# Patient Record
Sex: FEMALE | Race: WHITE | NOT HISPANIC OR LATINO | Employment: FULL TIME | URBAN - METROPOLITAN AREA
[De-identification: names, ages, dates, MRNs, and addresses within clinical notes are randomized per-mention and may not be internally consistent; named-entity substitution may affect disease eponyms.]

---

## 2023-03-28 LAB
ALANINE AMINOTRANSFERASE (SGPT) (U/L) IN SER/PLAS: 16 U/L (ref 7–45)
ALBUMIN (G/DL) IN SER/PLAS: 4.3 G/DL (ref 3.4–5)
ALKALINE PHOSPHATASE (U/L) IN SER/PLAS: 44 U/L (ref 33–136)
ANION GAP IN SER/PLAS: 10 MMOL/L (ref 10–20)
ASPARTATE AMINOTRANSFERASE (SGOT) (U/L) IN SER/PLAS: 21 U/L (ref 9–39)
BILIRUBIN TOTAL (MG/DL) IN SER/PLAS: 0.5 MG/DL (ref 0–1.2)
CALCIDIOL (25 OH VITAMIN D3) (NG/ML) IN SER/PLAS: 31 NG/ML
CALCIUM (MG/DL) IN SER/PLAS: 9.1 MG/DL (ref 8.6–10.3)
CARBON DIOXIDE, TOTAL (MMOL/L) IN SER/PLAS: 31 MMOL/L (ref 21–32)
CHLORIDE (MMOL/L) IN SER/PLAS: 101 MMOL/L (ref 98–107)
CHOLESTEROL (MG/DL) IN SER/PLAS: 208 MG/DL (ref 0–199)
CHOLESTEROL IN HDL (MG/DL) IN SER/PLAS: 57.9 MG/DL
CHOLESTEROL/HDL RATIO: 3.6
CREATININE (MG/DL) IN SER/PLAS: 0.66 MG/DL (ref 0.5–1.05)
GFR FEMALE: >90 ML/MIN/1.73M2
GLUCOSE (MG/DL) IN SER/PLAS: 80 MG/DL (ref 74–99)
LDL: 136 MG/DL (ref 0–99)
POTASSIUM (MMOL/L) IN SER/PLAS: 4.3 MMOL/L (ref 3.5–5.3)
PROTEIN TOTAL: 6.9 G/DL (ref 6.4–8.2)
SODIUM (MMOL/L) IN SER/PLAS: 138 MMOL/L (ref 136–145)
TRIGLYCERIDE (MG/DL) IN SER/PLAS: 73 MG/DL (ref 0–149)
UREA NITROGEN (MG/DL) IN SER/PLAS: 18 MG/DL (ref 6–23)
VLDL: 15 MG/DL (ref 0–40)

## 2023-04-07 ENCOUNTER — TELEPHONE (OUTPATIENT)
Dept: PRIMARY CARE | Facility: CLINIC | Age: 68
End: 2023-04-07
Payer: COMMERCIAL

## 2023-04-07 NOTE — TELEPHONE ENCOUNTER
Jacquelin dropped off physician screening form to be filled out and faxed back to number on form. Put In Manisha's bin

## 2023-12-11 NOTE — PROGRESS NOTES
"Subjective   Reason for Visit: Jacquelin Mehta is an 68 y.o. female here for Annual CPE      HPI  Jacquelin is a 67 yo female presenting today for Annual CPE    Pt presents today for Annual CPE  Pt reports \"I need medicine for my cold sore breakouts\"  No other concerns today   She is still working with elderly people in [facility]     Pt is not sure about flu vaccine  States \"maybe you can convince me, you did last year\"     #HM  MAMM: DUE per GYN   COLON: UTD  DEXA: 2022  FBW: UTD March 2023  VACCINES: Refuses flu and covid        Patient Care Team:  DAYNA Bello as PCP - General  DAYNA Bello as PCP - [facility] P[facility]       Review of Systems  All 13 systems were reviewed and are within normal limits except positive and pertinent negative responses which are noted below or in HPI.      Objective   Vitals:  /78   Pulse 76   Ht 1.626 m (5' 4\")   Wt 83.5 kg (184 lb)   BMI 31.58 kg/m²       Current Outpatient Medications   Medication Instructions    valACYclovir (VALTREX) 1,000 mg, oral, 2 times daily         Physical Exam  Vitals reviewed.   HENT:      Right Ear: Tympanic membrane normal.      Left Ear: Tympanic membrane normal.      Mouth/Throat:      Mouth: Mucous membranes are moist.   Eyes:      Conjunctiva/sclera: Conjunctivae normal.   Cardiovascular:      Pulses: Normal pulses.      Heart sounds: Normal heart sounds.   Pulmonary:      Effort: Pulmonary effort is normal.      Breath sounds: Normal breath sounds.   Abdominal:      General: Bowel sounds are normal.   Skin:     General: Skin is warm and dry.      Capillary Refill: Capillary refill takes less than 2 seconds.   Neurological:      Mental Status: She is alert.   Psychiatric:         Mood and Affect: Mood normal.         Behavior: Behavior normal.         Assessment/Plan     Problem List Items Addressed This Visit             ICD-10-CM    HSV (herpes simplex virus) infection B00.9    Relevant Medications    valACYclovir " (Valtrex) 1 gram tablet     Other Visit Diagnoses         Codes    Annual physical exam    -  Primary Z00.00    Need for immunization against influenza     Z23    Relevant Orders    Flu vaccine, quadrivalent, high-dose, preservative free, age 65y+ (FLUZONE) (Completed)    Class 1 obesity due to excess calories without serious comorbidity with body mass index (BMI) of 31.0 to 31.9 in adult     E66.09, Z68.31    Relevant Orders    Comprehensive Metabolic Panel    Lipid Panel    Vitamin D 25-Hydroxy,Total (for eval of Vitamin D levels)

## 2023-12-12 ENCOUNTER — OFFICE VISIT (OUTPATIENT)
Dept: PRIMARY CARE | Facility: CLINIC | Age: 68
End: 2023-12-12
Payer: COMMERCIAL

## 2023-12-12 VITALS
SYSTOLIC BLOOD PRESSURE: 134 MMHG | HEART RATE: 76 BPM | HEIGHT: 64 IN | BODY MASS INDEX: 31.41 KG/M2 | WEIGHT: 184 LBS | DIASTOLIC BLOOD PRESSURE: 78 MMHG

## 2023-12-12 DIAGNOSIS — B00.9 HSV (HERPES SIMPLEX VIRUS) INFECTION: ICD-10-CM

## 2023-12-12 DIAGNOSIS — Z00.00 ANNUAL PHYSICAL EXAM: Primary | ICD-10-CM

## 2023-12-12 DIAGNOSIS — Z23 NEED FOR IMMUNIZATION AGAINST INFLUENZA: ICD-10-CM

## 2023-12-12 DIAGNOSIS — E66.09 CLASS 1 OBESITY DUE TO EXCESS CALORIES WITHOUT SERIOUS COMORBIDITY WITH BODY MASS INDEX (BMI) OF 31.0 TO 31.9 IN ADULT: ICD-10-CM

## 2023-12-12 PROBLEM — E78.5 HYPERLIPIDEMIA: Status: ACTIVE | Noted: 2023-12-12

## 2023-12-12 PROBLEM — N81.4 CYSTOCELE WITH UTERINE PROLAPSE: Status: ACTIVE | Noted: 2023-12-12

## 2023-12-12 PROBLEM — Z86.010 HISTORY OF COLON POLYPS: Status: ACTIVE | Noted: 2023-12-12

## 2023-12-12 PROBLEM — M62.89 PELVIC FLOOR DYSFUNCTION: Status: ACTIVE | Noted: 2023-12-12

## 2023-12-12 PROBLEM — N81.4 PROLAPSE, UTEROVAGINAL: Status: ACTIVE | Noted: 2023-12-12

## 2023-12-12 PROBLEM — M25.561 BILATERAL KNEE PAIN: Status: ACTIVE | Noted: 2023-12-12

## 2023-12-12 PROBLEM — K57.90 DIVERTICULOSIS: Status: ACTIVE | Noted: 2023-12-12

## 2023-12-12 PROBLEM — E28.39 HYPOESTROGENISM: Status: ACTIVE | Noted: 2023-12-12

## 2023-12-12 PROBLEM — D12.6 POLYP OF COLON, ADENOMATOUS: Status: ACTIVE | Noted: 2023-12-12

## 2023-12-12 PROBLEM — M25.562 BILATERAL KNEE PAIN: Status: ACTIVE | Noted: 2023-12-12

## 2023-12-12 PROBLEM — Z86.0100 HISTORY OF COLON POLYPS: Status: ACTIVE | Noted: 2023-12-12

## 2023-12-12 PROCEDURE — 1036F TOBACCO NON-USER: CPT | Performed by: NURSE PRACTITIONER

## 2023-12-12 PROCEDURE — 3008F BODY MASS INDEX DOCD: CPT | Performed by: NURSE PRACTITIONER

## 2023-12-12 PROCEDURE — 99397 PER PM REEVAL EST PAT 65+ YR: CPT | Performed by: NURSE PRACTITIONER

## 2023-12-12 PROCEDURE — 1159F MED LIST DOCD IN RCRD: CPT | Performed by: NURSE PRACTITIONER

## 2023-12-12 PROCEDURE — 1126F AMNT PAIN NOTED NONE PRSNT: CPT | Performed by: NURSE PRACTITIONER

## 2023-12-12 PROCEDURE — 1160F RVW MEDS BY RX/DR IN RCRD: CPT | Performed by: NURSE PRACTITIONER

## 2023-12-12 PROCEDURE — 90471 IMMUNIZATION ADMIN: CPT | Performed by: NURSE PRACTITIONER

## 2023-12-12 PROCEDURE — 90662 IIV NO PRSV INCREASED AG IM: CPT | Performed by: NURSE PRACTITIONER

## 2023-12-12 RX ORDER — VALACYCLOVIR HYDROCHLORIDE 1 G/1
1000 TABLET, FILM COATED ORAL 2 TIMES DAILY
Qty: 14 TABLET | Refills: 2 | Status: SHIPPED | OUTPATIENT
Start: 2023-12-12 | End: 2023-12-19

## 2023-12-12 ASSESSMENT — ENCOUNTER SYMPTOMS
OCCASIONAL FEELINGS OF UNSTEADINESS: 0
DEPRESSION: 0
LOSS OF SENSATION IN FEET: 0

## 2023-12-12 NOTE — PATIENT INSTRUCTIONS
Thank you for seeing me today.  It was a pleasure to see you again!    Today we did your Annual Physical Exam and discussed the following:     Refill on Valacyclovir PRN for oral herpes     Flu vaccine today    Lab work ordered today.  Please have your blood drawn in the next 1-2 weeks.  You need to be FASTING for 12 hours prior to blood draw.  You may only have water.  Please contact your insurance company to ask about the best location to get blood drawn.  We will contact you with the results of your blood work and any necessary adjustments  to your plan of care, if you do not hear from us within 3-5 days of having your blood drawn, please call the office at [phone].    Mammogram due in Jan     RTC ANNUALLY AND AS NEEDED

## 2024-01-30 ENCOUNTER — HOSPITAL ENCOUNTER (OUTPATIENT)
Dept: RADIOLOGY | Facility: HOSPITAL | Age: 69
Discharge: HOME | End: 2024-01-30
Payer: COMMERCIAL

## 2024-01-30 DIAGNOSIS — Z12.31 ENCOUNTER FOR SCREENING MAMMOGRAM FOR MALIGNANT NEOPLASM OF BREAST: ICD-10-CM

## 2024-01-30 PROCEDURE — 77067 SCR MAMMO BI INCL CAD: CPT

## 2024-01-30 PROCEDURE — 77067 SCR MAMMO BI INCL CAD: CPT | Mod: BILATERAL PROCEDURE | Performed by: RADIOLOGY

## 2024-01-30 PROCEDURE — 77063 BREAST TOMOSYNTHESIS BI: CPT | Mod: BILATERAL PROCEDURE | Performed by: RADIOLOGY

## 2024-01-31 DIAGNOSIS — Z12.31 SCREENING MAMMOGRAM FOR BREAST CANCER: Primary | ICD-10-CM

## 2024-02-05 ENCOUNTER — TELEPHONE (OUTPATIENT)
Dept: OBSTETRICS AND GYNECOLOGY | Facility: CLINIC | Age: 69
End: 2024-02-05
Payer: COMMERCIAL

## 2024-02-05 NOTE — TELEPHONE ENCOUNTER
Calling about her mammogram and she's in [place] and they are asking her to come back and do a 3d one so she's wondering if she should do it in [place]

## 2024-02-06 ENCOUNTER — TELEPHONE (OUTPATIENT)
Dept: OBSTETRICS AND GYNECOLOGY | Facility: CLINIC | Age: 69
End: 2024-02-06
Payer: COMMERCIAL

## 2024-02-06 NOTE — TELEPHONE ENCOUNTER
Left message about doing follow-up mammogram on right breast. Suggested she do it at the same location so images can be compared.

## 2024-02-06 NOTE — TELEPHONE ENCOUNTER
----- Message from Patricia Reyes MD sent at 1/31/2024 12:19 PM EST -----  Needs additional images on right. Order placed. Pls notify pt

## 2024-02-07 NOTE — TELEPHONE ENCOUNTER
Left phone message for Jacquelin Mehta asking for more information to address issue for which she called.

## 2024-02-19 ENCOUNTER — HOSPITAL ENCOUNTER (OUTPATIENT)
Dept: RADIOLOGY | Facility: HOSPITAL | Age: 69
Discharge: HOME | End: 2024-02-19
Payer: COMMERCIAL

## 2024-02-19 DIAGNOSIS — R92.8 ABNORMAL MAMMOGRAM: ICD-10-CM

## 2024-02-19 PROCEDURE — G0279 TOMOSYNTHESIS, MAMMO: HCPCS | Mod: RIGHT SIDE | Performed by: RADIOLOGY

## 2024-02-19 PROCEDURE — 76642 ULTRASOUND BREAST LIMITED: CPT | Mod: RIGHT SIDE | Performed by: RADIOLOGY

## 2024-02-19 PROCEDURE — 77061 BREAST TOMOSYNTHESIS UNI: CPT | Mod: RT

## 2024-02-19 PROCEDURE — 76642 ULTRASOUND BREAST LIMITED: CPT | Mod: RT

## 2024-02-19 PROCEDURE — 77065 DX MAMMO INCL CAD UNI: CPT | Mod: RIGHT SIDE | Performed by: RADIOLOGY

## 2024-02-19 PROCEDURE — 76982 USE 1ST TARGET LESION: CPT | Mod: RT

## 2024-03-19 ENCOUNTER — LAB (OUTPATIENT)
Dept: LAB | Facility: LAB | Age: 69
End: 2024-03-19
Payer: COMMERCIAL

## 2024-03-19 DIAGNOSIS — E66.09 CLASS 1 OBESITY DUE TO EXCESS CALORIES WITHOUT SERIOUS COMORBIDITY WITH BODY MASS INDEX (BMI) OF 31.0 TO 31.9 IN ADULT: ICD-10-CM

## 2024-03-19 LAB
25(OH)D3 SERPL-MCNC: 30 NG/ML (ref 30–100)
ALBUMIN SERPL BCP-MCNC: 4.1 G/DL (ref 3.4–5)
ALP SERPL-CCNC: 41 U/L (ref 33–136)
ALT SERPL W P-5'-P-CCNC: 17 U/L (ref 7–45)
ANION GAP SERPL CALC-SCNC: 16 MMOL/L (ref 10–20)
AST SERPL W P-5'-P-CCNC: 22 U/L (ref 9–39)
BILIRUB SERPL-MCNC: 0.5 MG/DL (ref 0–1.2)
BUN SERPL-MCNC: 9 MG/DL (ref 6–23)
CALCIUM SERPL-MCNC: 9.4 MG/DL (ref 8.6–10.3)
CHLORIDE SERPL-SCNC: 104 MMOL/L (ref 98–107)
CHOLEST SERPL-MCNC: 210 MG/DL (ref 0–199)
CHOLESTEROL/HDL RATIO: 3.6
CO2 SERPL-SCNC: 27 MMOL/L (ref 21–32)
CREAT SERPL-MCNC: 0.66 MG/DL (ref 0.5–1.05)
EGFRCR SERPLBLD CKD-EPI 2021: >90 ML/MIN/1.73M*2
GLUCOSE SERPL-MCNC: 83 MG/DL (ref 74–99)
HDLC SERPL-MCNC: 59.1 MG/DL
LDLC SERPL CALC-MCNC: 137 MG/DL
NON HDL CHOLESTEROL: 151 MG/DL (ref 0–149)
POTASSIUM SERPL-SCNC: 4.1 MMOL/L (ref 3.5–5.3)
PROT SERPL-MCNC: 6.8 G/DL (ref 6.4–8.2)
SODIUM SERPL-SCNC: 143 MMOL/L (ref 136–145)
TRIGL SERPL-MCNC: 72 MG/DL (ref 0–149)
VLDL: 14 MG/DL (ref 0–40)

## 2024-03-19 PROCEDURE — 80061 LIPID PANEL: CPT

## 2024-03-19 PROCEDURE — 36415 COLL VENOUS BLD VENIPUNCTURE: CPT

## 2024-03-19 PROCEDURE — 82306 VITAMIN D 25 HYDROXY: CPT

## 2024-03-19 PROCEDURE — 80053 COMPREHEN METABOLIC PANEL: CPT

## 2024-03-20 NOTE — RESULT ENCOUNTER NOTE
Jacquelin Mehta    I have reviewed all of your blood work and it looks fine.   I would recommend taking at least 2000 Vitamin D daily as your level is low end of normal.    Your LDLs (bad cholesterol) were higher than normal.    Please try to exercise regularly: at least 150 minutes/week  Cut back on foods high in trans fat and saturated fats, like red meats, creams, cheese, and butter  Limit sweets and salt   Our goal is to have your LDL's < 70    If you have any questions, please feel free to call my office.    Take Care,   Manisha

## 2024-06-24 ENCOUNTER — APPOINTMENT (OUTPATIENT)
Dept: OBSTETRICS AND GYNECOLOGY | Facility: CLINIC | Age: 69
End: 2024-06-24
Payer: COMMERCIAL

## 2024-08-01 ENCOUNTER — APPOINTMENT (OUTPATIENT)
Dept: OBSTETRICS AND GYNECOLOGY | Facility: CLINIC | Age: 69
End: 2024-08-01
Payer: COMMERCIAL

## 2024-10-16 NOTE — PROGRESS NOTES
Urogynecology  Provider:  Patricia Reyes MD  [phone]      ASSESSMENT AND PLAN:   69 y.o. female with rectocele. S/p 12/1/2017 Surgery: Robotic KRISTINA, bilateral salpingectomy, bilateral oophorectomy, SCPXY with mesh, perineorrhaphy, cystoscopy, Restorelle Y mesh and Advantage FIT MUS by [employer].         Diagnoses:   #1 Rectocele     Plan:   1. Rectocele   - Advised her to continue keeping stool soft and regular.   - Not bothersome and will continue conservative management of sx surveillance at this time. If she wants to pursue a prolapse repair, we would recommend an isolated posterior repair.     2. Annual care   - Scheduled her mammogram for Feb. 2025 one year after follow up breast U/S and diagnostic mammo.   - She is doing well.   - All questions answered.      Follow-up in 1 year with Dr. Reyes for prolapse check and annual mesh check.      Scribe Attestation:   I, Mary Calvillo, am scribing for virtually, and in the presence of Patricia Reyes MD on 10/17/2024 at 5:09 PM.     Agree with above. I Dr. Reyes, personally performed the services described in the documentation which was scribed virtually and confirm it is both complete and accurate.  Patricia Reyes MD        Problem List Items Addressed This Visit    None          I spent a total of eConsult Time: 25 minutes in face to face and non face to face time.        Patricia Reyes MD        HISTORY OF PRESENT ILLNESS:   .Jacquelin Mehta is a 69 y.o. female who presents for her yearly visit.     Record Review:   Last visit 6/2023  Female with a hx of SHERWIN and uterovaginal prolapse. Now with rectocele but is not bothersome to the patient planning to continue sx observation.   Diagnoses:   #1 Stress urinary incontinence (resolved)  #2 Uterovaginal prolapse with predominant cystocele (resolved)  #3 Rectocele, stage 2  #4 Breast cancer screening  Plan:   1. Uterovaginal prolapse, SHERWIN  - 12/1/2017 Surgery: Robotic KRISTINA, bilateral  salpingectomy, bilateral oophorectomy, SCPXY with mesh, perineorrhaphy, cystoscopy, Restorelle Y mesh and Advantage FIT MUS by [employer].   - No evidence of SHERWIN by clinical exam and subjectively per the patient. Stage 2 rectocele upon standing straining exam, prolapse is unchanged in the supine position. TVT in place with no mesh erosion visualized or palpated.   2. Posterior vaginal wall prolapse, stage 2  - Her Ap and Bp come to 0 on exam but she continues to have good anterior wall and apical support.   - Encouraged her to avoid constipation as this may worsen her prolapse stage and sxs.   - Not bothersome and will continue conservative management of sx surveillance at this time.   - If she wants to pursue a prolapse repair we would recommend an isolated posterior repair.   - POP-Q: Stage: 2~and~patient was standing. Aa: -3. Ba: -3 C: -9 Ap: 0. Bp: 0. D: X.   3. Breast cancer screening  - Mammogram w/Tomosynthesis requisition sent today due by 4/26/2024.   Follow-up in 1 year with Dr. Reyes for prolapse check and annual mesh check.    - 2/19/24 R diagnostic U/S: BI-RADS Category:  2 Benign.   - 2/19/24 R diagnostic mammogram: BI-RADS Category: 2 Benign.   - 1/30/24 Mammogram - 1. Asymmetry in the right breast that requires further assessment with spot compression CC mammogram with tomosynthesis imaging and potentially ultrasound. 2. No mammographic finding is evident in the left breast to suggest malignancy. BI-RADS CATEGORY: BI-RADS Category:  0 Incomplete    Prolapse Symptoms:  - Denies POP is bothersome.     Bowel Symptoms:   - Denies constipation.        Past Medical History:      Past Medical History:   Diagnosis Date    Cellulitis, unspecified 12/14/2017    Wound cellulitis    Elevated blood-pressure reading, without diagnosis of hypertension 12/10/2014    Elevated blood pressure reading without diagnosis of hypertension    Encounter for fitting and adjustment of other specified devices 08/02/2017  "   Encounter for fitting and adjustment of pessary    Encounter for gynecological examination (general) (routine) without abnormal findings 12/12/2018    Well woman exam with routine gynecological exam    Encounter for screening for malignant neoplasm of colon     Encounter for screening colonoscopy    Encounter for screening for malignant neoplasm of vagina     Vaginal Pap smear    Other conditions influencing health status 01/25/2018    History of cough    Other specified postprocedural states 12/21/2017    Post-operative state    Pain in unspecified knee     Joint pain, knee    Personal history of other diseases of the musculoskeletal system and connective tissue     Personal history of arthritis    Personal history of other diseases of the nervous system and sense organs 11/28/2017    History of conjunctivitis    Personal history of other infectious and parasitic diseases 03/24/2016    History of herpes zoster    Personal history of other medical treatment     History of mammogram    Personal history of other specified conditions 02/07/2019    History of palpitations    Personal history of other specified conditions 11/28/2017    History of paresthesia    Urinary tract infection, site not specified 08/24/2017    Acute lower urinary tract infection          Past Surgical History:       Past Surgical History:   Procedure Laterality Date    ANKLE SURGERY  07/30/2014    Ankle Surgery    KNEE SURGERY  07/29/2014    Knee Surgery    OTHER SURGICAL HISTORY  12/09/2020    Hysterectomy    OTHER SURGICAL HISTORY  12/09/2020    Urethropexy         Medications:       Prior to Admission medications    Not on File        ROS  Review of Systems     No results found for: \"BLOODU\", \"NITRITEU\", \"UROBILINOGEN\"      PHYSICAL EXAM:      There were no vitals taken for this visit.     No LMP recorded. Patient is postmenopausal.      Declines chaperone for physical exam.      Well developed, well nourished, in no apparent distress. " "  Neurologic/Psychiatric:  Awake, Alert and Oriented times 3.  Affect normal.     Normal breast exam bilaterally.     Soft abdomen. Non tender.     GENITAL/URINARY:       External Genitalia:  The patient has normal appearing external genitalia, normal skenes and bartholins glands, and a normal hair distribution.  Her vulva is without lesions, erythema or discharge.  It is non-tender with appropriate sensation.     Urethral Meatus:  Size normal, Location normal, Lesions absent, Prolapse absent,      Urethra:  Fullness absent, Masses absent,      Bladder:  Fullness absent, Masses absent, Tenderness absent,      Vagina:  General appearance normal, Discharge absent, Lesions absent, Well supported apex, no mesh erosion.      Cervix: Normal, no discharge.   Uterus:  surgically absent     Anus/Perineum:  Lesions absent and Masses absent, normal rectal exam, no mesh erosion  Normal Perineum         POP-Q:  Position: sitting    Aa: -3       Ba:  C: -10   Gh:  Pb:  TVL:   10       Ap: 0 Bp:  D:   -10             Data and DIAGNOSTIC STUDIES REVIEWED   No results found.   No results found for: \"URINECULTURE\", \"UAMICCOMM\"   Lab Results   Component Value Date    GLUCOSE 83 03/19/2024    CALCIUM 9.4 03/19/2024     03/19/2024    K 4.1 03/19/2024    CO2 27 03/19/2024     03/19/2024    BUN 9 03/19/2024    CREATININE 0.66 03/19/2024     Lab Results   Component Value Date    WBC 3.5 (L) 11/30/2020    HGB 13.2 11/30/2020    HCT 41.4 11/30/2020    MCV 97 11/30/2020     11/30/2020          Patricia Reyes MD        "

## 2024-10-17 ENCOUNTER — OFFICE VISIT (OUTPATIENT)
Dept: OBSTETRICS AND GYNECOLOGY | Facility: CLINIC | Age: 69
End: 2024-10-17
Payer: COMMERCIAL

## 2024-10-17 VITALS
HEIGHT: 64 IN | DIASTOLIC BLOOD PRESSURE: 86 MMHG | BODY MASS INDEX: 32.1 KG/M2 | SYSTOLIC BLOOD PRESSURE: 135 MMHG | WEIGHT: 188 LBS

## 2024-10-17 DIAGNOSIS — Z12.31 VISIT FOR SCREENING MAMMOGRAM: ICD-10-CM

## 2024-10-17 DIAGNOSIS — N39.9 URINARY DISORDER: Primary | ICD-10-CM

## 2024-10-17 PROCEDURE — 3008F BODY MASS INDEX DOCD: CPT | Performed by: OBSTETRICS & GYNECOLOGY

## 2024-10-17 PROCEDURE — 1123F ACP DISCUSS/DSCN MKR DOCD: CPT | Performed by: OBSTETRICS & GYNECOLOGY

## 2024-10-17 PROCEDURE — 99213 OFFICE O/P EST LOW 20 MIN: CPT | Performed by: OBSTETRICS & GYNECOLOGY

## 2024-12-18 NOTE — PROGRESS NOTES
"Subjective   Reason for Visit: Jacquelin Mehta is an 69 y.o. female here for a CPE     Chief Complaint   Patient presents with    Annual Exam     Jacquelin Mehta is a 69 y.o. female is here today for a CPE. Patient reports needing cold sore cream and med for virtigo. Needs referral for derm. Wants hearing screening and wondering about colonoscopy. Due 2031. Itching in ear canal.         HPI  Jacquelin is a 68 yo est female presenting today for Annual CPE     Dx: NONE     Pt reports feeling well today  Would like to see derm for routine skin check and audiology for hearing test, she denies any concerns with either, just wants to get checked out    She recently found out that all 3 of her brothers have developed \"afib\"  Denies any CP/ZAMAN/Palpitations      #CPE   Occupation: Works at [employer]     Do you take any herbs or supplements that were not prescribed by a doctor? Vitamin D, Zinc, MVI, Calcium, CoQ10  Are you taking calcium supplements? Yes   Are you taking aspirin daily? no    Colon Cancer Screening: due 2026    LMP: menopause   Mammogram: due Jan 2025  GYN: Dr. Reyes    Fasting blood work: Due   Last eye exam: has appt in March 2025  Last dental Exam: Dec 2024  Exercise: regularly   Mood: no concerns   Sleep: no concerns   Vaccines: needs flu and Prevnar       Health Maintenance Due   Topic Date Due    Yearly Adult Physical  Never done    Hepatitis C Screening  Never done    Diabetes Screening  01/24/2021    Mammogram  02/19/2025       No Known Allergies            No visits with results within 60 Day(s) from this visit.   Latest known visit with results is:   Lab on 03/19/2024   Component Date Value Ref Range Status    Glucose 03/19/2024 83  74 - 99 mg/dL Final    Sodium 03/19/2024 143  136 - 145 mmol/L Final    Potassium 03/19/2024 4.1  3.5 - 5.3 mmol/L Final    Chloride 03/19/2024 104  98 - 107 mmol/L Final    Bicarbonate 03/19/2024 27  21 - 32 mmol/L Final    Anion Gap 03/19/2024 16  10 - 20 mmol/L " Final    Urea Nitrogen 03/19/2024 9  6 - 23 mg/dL Final    Creatinine 03/19/2024 0.66  0.50 - 1.05 mg/dL Final    eGFR 03/19/2024 >90  >60 mL/min/1.73m*2 Final    Calculations of estimated GFR are performed using the 2021 CKD-EPI Study Refit equation without the race variable for the IDMS-Traceable creatinine methods.  https://jasn.asnjournals.org/content/early/2021/09/22/ASN.9296613550    Calcium 03/19/2024 9.4  8.6 - 10.3 mg/dL Final    Albumin 03/19/2024 4.1  3.4 - 5.0 g/dL Final    Alkaline Phosphatase 03/19/2024 41  33 - 136 U/L Final    Total Protein 03/19/2024 6.8  6.4 - 8.2 g/dL Final    AST 03/19/2024 22  9 - 39 U/L Final    Bilirubin, Total 03/19/2024 0.5  0.0 - 1.2 mg/dL Final    ALT 03/19/2024 17  7 - 45 U/L Final    Patients treated with Sulfasalazine may generate falsely decreased results for ALT.    Cholesterol 03/19/2024 210 (H)  0 - 199 mg/dL Final          Age      Desirable   Borderline High   High     0-19 Y     0 - 169       170 - 199     >/= 200    20-24 Y     0 - 189       190 - 224     >/= 225         >24 Y     0 - 199       200 - 239     >/= 240   **All ranges are based on fasting samples. Specific   therapeutic targets will vary based on patient-specific   cardiac risk.    Pediatric guidelines reference:Pediatrics 2011, 128(S5).Adult guidelines reference: NCEP ATPIII Guidelines,DORIS 2001, 258:2486-97    Venipuncture immediately after or during the administration of Metamizole may lead to falsely low results. Testing should be performed immediately prior to Metamizole dosing.    HDL-Cholesterol 03/19/2024 59.1  mg/dL Final      Age       Very Low   Low     Normal    High    0-19 Y    < 35      < 40     40-45     ----  20-24 Y    ----     < 40      >45      ----        >24 Y      ----     < 40     40-60      >60      Cholesterol/HDL Ratio 03/19/2024 3.6   Final      Ref Values  Desirable  < 3.4  High Risk  > 5.0    LDL Calculated 03/19/2024 137 (H)  <=99 mg/dL Final                             "    Near   Borderline      AGE      Desirable  Optimal    High     High     Very High     0-19 Y     0 - 109     ---    110-129   >/= 130     ----    20-24 Y     0 - 119     ---    120-159   >/= 160     ----      >24 Y     0 -  99   100-129  130-159   160-189     >/=190      VLDL 03/19/2024 14  0 - 40 mg/dL Final    Triglycerides 03/19/2024 72  0 - 149 mg/dL Final       Age         Desirable   Borderline High   High     Very High   0 D-90 D    19 - 174         ----         ----        ----  91 D- 9 Y     0 -  74        75 -  99     >/= 100      ----    10-19 Y     0 -  89        90 - 129     >/= 130      ----    20-24 Y     0 - 114       115 - 149     >/= 150      ----         >24 Y     0 - 149       150 - 199    200- 499    >/= 500    Venipuncture immediately after or during the administration of Metamizole may lead to falsely low results. Testing should be performed immediately prior to Metamizole dosing.    Non HDL Cholesterol 03/19/2024 151 (H)  0 - 149 mg/dL Final          Age       Desirable   Borderline High   High     Very High     0-19 Y     0 - 119       120 - 144     >/= 145    >/= 160    20-24 Y     0 - 149       150 - 189     >/= 190      ----         >24 Y    30 mg/dL above LDL Cholesterol goal      Vitamin D, 25-Hydroxy, Total 03/19/2024 30  30 - 100 ng/mL Final         Patient Care Team:  DAYNA Bello as PCP - General  DAYNA Bello as PCP - [facility] PCP     Review of Systems  ROS was completed and all systems are negative with the exception of what was noted in the the HPI.       Objective   Vitals:  /83   Pulse 76   Ht 1.626 m (5' 4\")   Wt 86.2 kg (190 lb)   SpO2 97%   BMI 32.61 kg/m²       Current Outpatient Medications   Medication Instructions    clobetasol (Temovate) 0.05 % external solution Instill 2 drops in each ear twice daily x 7 days and as needed       Physical Exam  Vitals reviewed.   HENT:      Right Ear: Tympanic membrane normal. There is no " impacted cerumen.      Left Ear: Tympanic membrane normal. There is no impacted cerumen.   Cardiovascular:      Pulses: Normal pulses.      Heart sounds: Normal heart sounds.   Pulmonary:      Effort: Pulmonary effort is normal.      Breath sounds: Normal breath sounds.   Abdominal:      General: Bowel sounds are normal.   Neurological:      Mental Status: She is alert and oriented to person, place, and time.   Psychiatric:         Mood and Affect: Mood normal.         Assessment & Plan  Annual physical exam  - Counseled on healthy diet and regular exercise  - Fall avoidance information provided  - Personalized prevention plan provided   - Mammogram ordered per GYN   - Colon  UTD  - Vaccines; Prevnar and Flu vaccines today   - FBW ordered       Orders:    CBC; Future    Basic Metabolic Panel; Future    Lipid Panel; Future    Hemoglobin A1c; Future    Screening for multiple conditions  Depression screening completed using PHQ-2 questions with results documented in the chart/encounter (15 minutes)  See rooming screening section for documentation and/or progress note for additional information.         Encounter for screening for malignant neoplasm of skin    Orders:    Referral to Dermatology    Encounter for hearing examination, unspecified whether abnormal findings    Orders:    Referral to Audiology; Future    Need for vaccination    Orders:    Pneumococcal conjugate vaccine, 20-valent (PREVNAR 20)    Flu vaccine, trivalent, preservative free, HIGH-DOSE, age 65y+ (Fluzone)    Encounter for hepatitis C screening test for low risk patient    Orders:    Hepatitis C antibody; Future    Otorrhea of both ears    Orders:    clobetasol (Temovate) 0.05 % external solution; Instill 2 drops in each ear twice daily x 7 days and as needed

## 2024-12-19 ENCOUNTER — APPOINTMENT (OUTPATIENT)
Dept: PRIMARY CARE | Facility: CLINIC | Age: 69
End: 2024-12-19
Payer: COMMERCIAL

## 2024-12-19 VITALS
HEIGHT: 64 IN | HEART RATE: 76 BPM | BODY MASS INDEX: 32.44 KG/M2 | DIASTOLIC BLOOD PRESSURE: 83 MMHG | OXYGEN SATURATION: 97 % | WEIGHT: 190 LBS | SYSTOLIC BLOOD PRESSURE: 124 MMHG

## 2024-12-19 DIAGNOSIS — Z13.89 SCREENING FOR MULTIPLE CONDITIONS: ICD-10-CM

## 2024-12-19 DIAGNOSIS — Z12.83 ENCOUNTER FOR SCREENING FOR MALIGNANT NEOPLASM OF SKIN: ICD-10-CM

## 2024-12-19 DIAGNOSIS — H92.13 OTORRHEA OF BOTH EARS: ICD-10-CM

## 2024-12-19 DIAGNOSIS — Z00.00 ANNUAL PHYSICAL EXAM: Primary | ICD-10-CM

## 2024-12-19 DIAGNOSIS — Z11.59 ENCOUNTER FOR HEPATITIS C SCREENING TEST FOR LOW RISK PATIENT: ICD-10-CM

## 2024-12-19 DIAGNOSIS — Z23 NEED FOR VACCINATION: ICD-10-CM

## 2024-12-19 DIAGNOSIS — Z01.10 ENCOUNTER FOR HEARING EXAMINATION, UNSPECIFIED WHETHER ABNORMAL FINDINGS: ICD-10-CM

## 2024-12-19 PROCEDURE — 90662 IIV NO PRSV INCREASED AG IM: CPT | Performed by: NURSE PRACTITIONER

## 2024-12-19 PROCEDURE — 90471 IMMUNIZATION ADMIN: CPT | Performed by: NURSE PRACTITIONER

## 2024-12-19 PROCEDURE — 1036F TOBACCO NON-USER: CPT | Performed by: NURSE PRACTITIONER

## 2024-12-19 PROCEDURE — 99397 PER PM REEVAL EST PAT 65+ YR: CPT | Performed by: NURSE PRACTITIONER

## 2024-12-19 PROCEDURE — 1123F ACP DISCUSS/DSCN MKR DOCD: CPT | Performed by: NURSE PRACTITIONER

## 2024-12-19 PROCEDURE — 90677 PCV20 VACCINE IM: CPT | Performed by: NURSE PRACTITIONER

## 2024-12-19 PROCEDURE — 3008F BODY MASS INDEX DOCD: CPT | Performed by: NURSE PRACTITIONER

## 2024-12-19 PROCEDURE — 1159F MED LIST DOCD IN RCRD: CPT | Performed by: NURSE PRACTITIONER

## 2024-12-19 PROCEDURE — 1160F RVW MEDS BY RX/DR IN RCRD: CPT | Performed by: NURSE PRACTITIONER

## 2024-12-19 PROCEDURE — 90472 IMMUNIZATION ADMIN EACH ADD: CPT | Performed by: NURSE PRACTITIONER

## 2024-12-19 RX ORDER — CLOBETASOL PROPIONATE 0.5 MG/ML
SOLUTION TOPICAL
Qty: 60 ML | Refills: 0 | Status: SHIPPED | OUTPATIENT
Start: 2024-12-19

## 2024-12-19 NOTE — ASSESSMENT & PLAN NOTE
- Counseled on healthy diet and regular exercise  - Fall avoidance information provided  - Personalized prevention plan provided   - Mammogram ordered per GYN   - Colon  UTD  - Vaccines; Prevnar and Flu vaccines today   - FBW ordered       Orders:    CBC; Future    Basic Metabolic Panel; Future    Lipid Panel; Future    Hemoglobin A1c; Future

## 2024-12-19 NOTE — PATIENT INSTRUCTIONS
Thank you for seeing me today Jacquelin LASHELL Mehta, it was a pleasure to see you again!    Today we did your Annual Physical Exam and discussed the following:     Instill 2 drops in each ear twice daily x 7 days and see Derm    Flu and Pneumonia vaccines today    See Dermatology and Audiology    Lab work ordered today.  Please have your blood drawn in the next 1-2 weeks.  You need to be FASTING for 12 hours prior to blood draw.  You may only have water.  Please contact your insurance company to ask about the best location to get blood drawn.  We will contact you with the results of your blood work and any necessary adjustments  to your plan of care, if you do not hear from us within 3-5 days of having your blood drawn, please call the office at [phone].      For assistance with scheduling referrals or consultations, please call [phone] or [phone].    For laboratory, radiology, and other tests, please call [phone] ([phone] for pediatrics).   For laboratory locations, please visit [email or web address]   If you do not get results within 7-10 days, or you have any questions or concerns, please send a message, call the office ([phone]), or return to the office for a follow-up appointment.     For acute/sick visits, if you are unable to get an office visit, you can do a  On Demand Virtual Visit that is accessible via your My Chart account.  For emergencies, call 9-1-1 or go to the nearest Emergency Department.     Please schedule additional appointment(s) to address concern(s) not addressed today.    Please review prescription inserts and published information for possible adverse effects of all medications.     In general, results are discussed over the phone or via  [employer].     You can see your health information, review clinical summaries from office visits & test results online when you follow your health with MY  Chart, a personal health record.    To sign up go to [email or web address].   If you need assistance with signing up or trouble getting into your account call Amy Patient Line 24/7 at [phone]     RTC ANNUALLY AND AS NEEDED     Have a nice day and Happy Holidays!     Manisha Salinas NP

## 2025-01-22 ENCOUNTER — LAB (OUTPATIENT)
Dept: LAB | Facility: LAB | Age: 70
End: 2025-01-22
Payer: COMMERCIAL

## 2025-01-22 DIAGNOSIS — Z11.59 ENCOUNTER FOR HEPATITIS C SCREENING TEST FOR LOW RISK PATIENT: ICD-10-CM

## 2025-01-22 DIAGNOSIS — Z00.00 ANNUAL PHYSICAL EXAM: ICD-10-CM

## 2025-01-22 LAB
ANION GAP SERPL CALC-SCNC: 13 MMOL/L (ref 10–20)
BUN SERPL-MCNC: 13 MG/DL (ref 6–23)
CALCIUM SERPL-MCNC: 9 MG/DL (ref 8.6–10.3)
CHLORIDE SERPL-SCNC: 105 MMOL/L (ref 98–107)
CHOLEST SERPL-MCNC: 229 MG/DL (ref 0–199)
CHOLESTEROL/HDL RATIO: 4
CO2 SERPL-SCNC: 27 MMOL/L (ref 21–32)
CREAT SERPL-MCNC: 0.69 MG/DL (ref 0.5–1.05)
EGFRCR SERPLBLD CKD-EPI 2021: >90 ML/MIN/1.73M*2
ERYTHROCYTE [DISTWIDTH] IN BLOOD BY AUTOMATED COUNT: 13.7 % (ref 11.5–14.5)
EST. AVERAGE GLUCOSE BLD GHB EST-MCNC: 103 MG/DL
GLUCOSE SERPL-MCNC: 86 MG/DL (ref 74–99)
HBA1C MFR BLD: 5.2 %
HCT VFR BLD AUTO: 37.6 % (ref 36–46)
HCV AB SER QL: NONREACTIVE
HDLC SERPL-MCNC: 57.7 MG/DL
HGB BLD-MCNC: 11.8 G/DL (ref 12–16)
LDLC SERPL CALC-MCNC: 154 MG/DL
MCH RBC QN AUTO: 28.2 PG (ref 26–34)
MCHC RBC AUTO-ENTMCNC: 31.4 G/DL (ref 32–36)
MCV RBC AUTO: 90 FL (ref 80–100)
NON HDL CHOLESTEROL: 171 MG/DL (ref 0–149)
NRBC BLD-RTO: 0 /100 WBCS (ref 0–0)
PLATELET # BLD AUTO: 258 X10*3/UL (ref 150–450)
POTASSIUM SERPL-SCNC: 4.3 MMOL/L (ref 3.5–5.3)
RBC # BLD AUTO: 4.19 X10*6/UL (ref 4–5.2)
SODIUM SERPL-SCNC: 141 MMOL/L (ref 136–145)
TRIGL SERPL-MCNC: 89 MG/DL (ref 0–149)
VLDL: 18 MG/DL (ref 0–40)
WBC # BLD AUTO: 3 X10*3/UL (ref 4.4–11.3)

## 2025-01-22 PROCEDURE — 80061 LIPID PANEL: CPT

## 2025-01-22 PROCEDURE — 85027 COMPLETE CBC AUTOMATED: CPT

## 2025-01-22 PROCEDURE — 80048 BASIC METABOLIC PNL TOTAL CA: CPT

## 2025-01-22 PROCEDURE — 86803 HEPATITIS C AB TEST: CPT

## 2025-01-22 PROCEDURE — 83036 HEMOGLOBIN GLYCOSYLATED A1C: CPT

## 2025-01-23 DIAGNOSIS — D70.9 NEUTROPENIA, UNSPECIFIED TYPE (CMS-HCC): Primary | ICD-10-CM

## 2025-01-24 ENCOUNTER — APPOINTMENT (OUTPATIENT)
Dept: AUDIOLOGY | Facility: CLINIC | Age: 70
End: 2025-01-24
Payer: COMMERCIAL

## 2025-01-24 DIAGNOSIS — H90.3 SENSORINEURAL HEARING LOSS (SNHL) OF BOTH EARS: Primary | ICD-10-CM

## 2025-01-24 PROCEDURE — 92550 TYMPANOMETRY & REFLEX THRESH: CPT | Performed by: AUDIOLOGIST

## 2025-01-24 PROCEDURE — 92557 COMPREHENSIVE HEARING TEST: CPT | Performed by: AUDIOLOGIST

## 2025-02-20 ENCOUNTER — HOSPITAL ENCOUNTER (OUTPATIENT)
Dept: RADIOLOGY | Facility: HOSPITAL | Age: 70
Discharge: HOME | End: 2025-02-20
Payer: COMMERCIAL

## 2025-02-20 VITALS — WEIGHT: 180 LBS | BODY MASS INDEX: 30.73 KG/M2 | HEIGHT: 64 IN

## 2025-02-20 DIAGNOSIS — Z12.31 VISIT FOR SCREENING MAMMOGRAM: ICD-10-CM

## 2025-02-20 PROCEDURE — 77063 BREAST TOMOSYNTHESIS BI: CPT | Performed by: STUDENT IN AN ORGANIZED HEALTH CARE EDUCATION/TRAINING PROGRAM

## 2025-02-20 PROCEDURE — 77067 SCR MAMMO BI INCL CAD: CPT | Performed by: STUDENT IN AN ORGANIZED HEALTH CARE EDUCATION/TRAINING PROGRAM

## 2025-02-20 PROCEDURE — 77067 SCR MAMMO BI INCL CAD: CPT

## 2025-03-27 DIAGNOSIS — D50.9 IRON DEFICIENCY ANEMIA, UNSPECIFIED IRON DEFICIENCY ANEMIA TYPE: Primary | ICD-10-CM

## 2025-03-27 DIAGNOSIS — D46.4 REFRACTORY ANEMIA: ICD-10-CM

## 2025-03-31 ENCOUNTER — OFFICE VISIT (OUTPATIENT)
Dept: HEMATOLOGY/ONCOLOGY | Facility: CLINIC | Age: 70
End: 2025-03-31
Payer: COMMERCIAL

## 2025-03-31 VITALS
WEIGHT: 187.94 LBS | SYSTOLIC BLOOD PRESSURE: 113 MMHG | TEMPERATURE: 97.7 F | BODY MASS INDEX: 33.3 KG/M2 | HEART RATE: 80 BPM | DIASTOLIC BLOOD PRESSURE: 76 MMHG | HEIGHT: 63 IN | OXYGEN SATURATION: 97 % | RESPIRATION RATE: 17 BRPM

## 2025-03-31 DIAGNOSIS — D46.4 REFRACTORY ANEMIA: ICD-10-CM

## 2025-03-31 DIAGNOSIS — D70.9 NEUTROPENIA, UNSPECIFIED TYPE: ICD-10-CM

## 2025-03-31 DIAGNOSIS — D50.9 IRON DEFICIENCY ANEMIA, UNSPECIFIED IRON DEFICIENCY ANEMIA TYPE: ICD-10-CM

## 2025-03-31 LAB
ALBUMIN SERPL BCP-MCNC: 4.5 G/DL (ref 3.4–5)
ALP SERPL-CCNC: 43 U/L (ref 33–136)
ALT SERPL W P-5'-P-CCNC: 26 U/L (ref 7–45)
ANION GAP SERPL CALC-SCNC: 12 MMOL/L (ref 10–20)
AST SERPL W P-5'-P-CCNC: 27 U/L (ref 9–39)
BASOPHILS # BLD AUTO: 0.03 X10*3/UL (ref 0–0.1)
BASOPHILS NFR BLD AUTO: 0.6 %
BILIRUB SERPL-MCNC: 0.5 MG/DL (ref 0–1.2)
BUN SERPL-MCNC: 16 MG/DL (ref 6–23)
CALCIUM SERPL-MCNC: 9.1 MG/DL (ref 8.6–10.3)
CHLORIDE SERPL-SCNC: 103 MMOL/L (ref 98–107)
CO2 SERPL-SCNC: 27 MMOL/L (ref 21–32)
CREAT SERPL-MCNC: 0.67 MG/DL (ref 0.5–1.05)
DAT-POLYSPECIFIC: NORMAL
EGFRCR SERPLBLD CKD-EPI 2021: >90 ML/MIN/1.73M*2
EOSINOPHIL # BLD AUTO: 0.02 X10*3/UL (ref 0–0.7)
EOSINOPHIL NFR BLD AUTO: 0.4 %
ERYTHROCYTE [DISTWIDTH] IN BLOOD BY AUTOMATED COUNT: 15.2 % (ref 11.5–14.5)
FERRITIN SERPL-MCNC: 14 NG/ML (ref 8–150)
FOLATE SERPL-MCNC: 15.3 NG/ML
GLUCOSE SERPL-MCNC: 89 MG/DL (ref 74–99)
HAPTOGLOB SERPL NEPH-MCNC: 123 MG/DL (ref 30–200)
HBV CORE AB SER QL: NONREACTIVE
HBV SURFACE AB SER-ACNC: 40.3 MIU/ML
HBV SURFACE AG SERPL QL IA: NONREACTIVE
HCT VFR BLD AUTO: 35.7 % (ref 36–46)
HCV AB SER QL: NONREACTIVE
HGB BLD-MCNC: 11.5 G/DL (ref 12–16)
HGB RETIC QN: 28 PG (ref 28–38)
IGA SERPL-MCNC: 160 MG/DL (ref 70–400)
IGG SERPL-MCNC: 1160 MG/DL (ref 700–1600)
IGM SERPL-MCNC: 21 MG/DL (ref 40–230)
IMM GRANULOCYTES # BLD AUTO: 0 X10*3/UL (ref 0–0.7)
IMM GRANULOCYTES NFR BLD AUTO: 0 % (ref 0–0.9)
IMMATURE RETIC FRACTION: 11.2 %
IRON SATN MFR SERPL: 12 % (ref 25–45)
IRON SERPL-MCNC: 53 UG/DL (ref 35–150)
LDH SERPL L TO P-CCNC: 157 U/L (ref 84–246)
LYMPHOCYTES # BLD AUTO: 1.39 X10*3/UL (ref 1.2–4.8)
LYMPHOCYTES NFR BLD AUTO: 29.4 %
MCH RBC QN AUTO: 28.1 PG (ref 26–34)
MCHC RBC AUTO-ENTMCNC: 32.2 G/DL (ref 32–36)
MCV RBC AUTO: 87 FL (ref 80–100)
MONOCYTES # BLD AUTO: 0.23 X10*3/UL (ref 0.1–1)
MONOCYTES NFR BLD AUTO: 4.9 %
NEUTROPHILS # BLD AUTO: 3.06 X10*3/UL (ref 1.2–7.7)
NEUTROPHILS NFR BLD AUTO: 64.7 %
NRBC BLD-RTO: ABNORMAL /100{WBCS}
PLATELET # BLD AUTO: 225 X10*3/UL (ref 150–450)
POTASSIUM SERPL-SCNC: 4.4 MMOL/L (ref 3.5–5.3)
PROT SERPL-MCNC: 6.8 G/DL (ref 6.4–8.2)
PROT SERPL-MCNC: 7.4 G/DL (ref 6.4–8.2)
RBC # BLD AUTO: 4.09 X10*6/UL (ref 4–5.2)
RETICS #: 0.06 X10*6/UL (ref 0.02–0.11)
RETICS/RBC NFR AUTO: 1.5 % (ref 0.5–2)
SODIUM SERPL-SCNC: 138 MMOL/L (ref 136–145)
TIBC SERPL-MCNC: 439 UG/DL (ref 240–445)
UIBC SERPL-MCNC: 386 UG/DL (ref 110–370)
VIT B12 SERPL-MCNC: 387 PG/ML (ref 211–911)
WBC # BLD AUTO: 4.7 X10*3/UL (ref 4.4–11.3)

## 2025-03-31 PROCEDURE — 82784 ASSAY IGA/IGD/IGG/IGM EACH: CPT | Mod: GEALAB | Performed by: PHYSICIAN ASSISTANT

## 2025-03-31 PROCEDURE — 87340 HEPATITIS B SURFACE AG IA: CPT | Mod: GEALAB | Performed by: PHYSICIAN ASSISTANT

## 2025-03-31 PROCEDURE — 36415 COLL VENOUS BLD VENIPUNCTURE: CPT | Performed by: PHYSICIAN ASSISTANT

## 2025-03-31 PROCEDURE — 82746 ASSAY OF FOLIC ACID SERUM: CPT | Mod: GEALAB | Performed by: PHYSICIAN ASSISTANT

## 2025-03-31 PROCEDURE — 82607 VITAMIN B-12: CPT | Mod: GEALAB | Performed by: PHYSICIAN ASSISTANT

## 2025-03-31 PROCEDURE — 86706 HEP B SURFACE ANTIBODY: CPT | Mod: GEALAB | Performed by: PHYSICIAN ASSISTANT

## 2025-03-31 PROCEDURE — 84155 ASSAY OF PROTEIN SERUM: CPT | Mod: 59,GEALAB | Performed by: PHYSICIAN ASSISTANT

## 2025-03-31 PROCEDURE — 83550 IRON BINDING TEST: CPT | Performed by: PHYSICIAN ASSISTANT

## 2025-03-31 PROCEDURE — 86235 NUCLEAR ANTIGEN ANTIBODY: CPT | Performed by: PHYSICIAN ASSISTANT

## 2025-03-31 PROCEDURE — 83540 ASSAY OF IRON: CPT | Performed by: PHYSICIAN ASSISTANT

## 2025-03-31 PROCEDURE — 86704 HEP B CORE ANTIBODY TOTAL: CPT | Mod: GEALAB | Performed by: PHYSICIAN ASSISTANT

## 2025-03-31 PROCEDURE — 88185 FLOWCYTOMETRY/TC ADD-ON: CPT | Mod: TC | Performed by: PHYSICIAN ASSISTANT

## 2025-03-31 PROCEDURE — 83010 ASSAY OF HAPTOGLOBIN QUANT: CPT | Mod: GEALAB | Performed by: PHYSICIAN ASSISTANT

## 2025-03-31 PROCEDURE — 80053 COMPREHEN METABOLIC PANEL: CPT | Performed by: PHYSICIAN ASSISTANT

## 2025-03-31 PROCEDURE — 82668 ASSAY OF ERYTHROPOIETIN: CPT | Performed by: PHYSICIAN ASSISTANT

## 2025-03-31 PROCEDURE — 84165 PROTEIN E-PHORESIS SERUM: CPT | Mod: GEALAB | Performed by: PHYSICIAN ASSISTANT

## 2025-03-31 PROCEDURE — 83521 IG LIGHT CHAINS FREE EACH: CPT | Mod: GEALAB | Performed by: PHYSICIAN ASSISTANT

## 2025-03-31 PROCEDURE — 85045 AUTOMATED RETICULOCYTE COUNT: CPT | Performed by: PHYSICIAN ASSISTANT

## 2025-03-31 PROCEDURE — 82728 ASSAY OF FERRITIN: CPT | Performed by: PHYSICIAN ASSISTANT

## 2025-03-31 PROCEDURE — 86038 ANTINUCLEAR ANTIBODIES: CPT | Mod: GEALAB | Performed by: PHYSICIAN ASSISTANT

## 2025-03-31 PROCEDURE — 88237 TISSUE CULTURE BONE MARROW: CPT | Performed by: PHYSICIAN ASSISTANT

## 2025-03-31 PROCEDURE — 85025 COMPLETE CBC W/AUTO DIFF WBC: CPT | Performed by: PHYSICIAN ASSISTANT

## 2025-03-31 PROCEDURE — 86880 COOMBS TEST DIRECT: CPT

## 2025-03-31 PROCEDURE — 83615 LACTATE (LD) (LDH) ENZYME: CPT | Performed by: PHYSICIAN ASSISTANT

## 2025-03-31 PROCEDURE — 86803 HEPATITIS C AB TEST: CPT | Mod: GEALAB | Performed by: PHYSICIAN ASSISTANT

## 2025-03-31 ASSESSMENT — ENCOUNTER SYMPTOMS
LOSS OF SENSATION IN FEET: 0
DEPRESSION: 0
OCCASIONAL FEELINGS OF UNSTEADINESS: 0

## 2025-03-31 ASSESSMENT — COLUMBIA-SUICIDE SEVERITY RATING SCALE - C-SSRS
2. HAVE YOU ACTUALLY HAD ANY THOUGHTS OF KILLING YOURSELF?: NO
1. IN THE PAST MONTH, HAVE YOU WISHED YOU WERE DEAD OR WISHED YOU COULD GO TO SLEEP AND NOT WAKE UP?: NO
6. HAVE YOU EVER DONE ANYTHING, STARTED TO DO ANYTHING, OR PREPARED TO DO ANYTHING TO END YOUR LIFE?: NO

## 2025-03-31 ASSESSMENT — PATIENT HEALTH QUESTIONNAIRE - PHQ9
SUM OF ALL RESPONSES TO PHQ9 QUESTIONS 1 AND 2: 0
1. LITTLE INTEREST OR PLEASURE IN DOING THINGS: NOT AT ALL
2. FEELING DOWN, DEPRESSED OR HOPELESS: NOT AT ALL

## 2025-03-31 ASSESSMENT — PAIN SCALES - GENERAL: PAINLEVEL_OUTOF10: 0-NO PAIN

## 2025-03-31 NOTE — PROGRESS NOTES
"Reason for Visit  Jacquelin Mehta is a 69 y.o. female w/no significant PMH referred by Anamaria Mueller CNP for leucopenia.     PMH/PSH: S/p 12/1/2017 Surgery: Robotic KRISTINA, bilateral salpingectomy, bilateral oophorectomy, SCPXY with mesh, perineorrhaphy, cystoscopy, Restorelle Y mesh and Advantage FIT MUS by [employer], Small bowel obstruction secondary to an incarcerated right inguinal hernia in 5/16/20217  FH: Denies FH of cancer, bleeding, clotting disorder.  Soc Hx: Denies smoking, ETOH, illicit drugs; works for [employer]; , 2 children.    History of Present Illness:  Upon review of labs, noted to have leucopenia with WBC in the 3 range since 2017, no CBC with differential  noted. Intermittent anemia and normal platelet count.    On assessment, reports feeling well. Donates blood every 9 weeks, recently donated blood. Exercises almost every day. Denies frequent infection, B symptoms, LAD, n/v/d/c/d/abd pain, SOB/ZAMAN/CP, neuropathy, rash, bleeding from any source or any other complaints at this time.    Review of Systems: All of the systems have been reviewed and are negative for complaints except what is stated in the HPI and/or past medical history.    Allergies and Intolerances:  No Known Allergies         Outpatient Medication Profile:  Current Outpatient Medications   Medication Sig Dispense Refill    clobetasol (Temovate) 0.05 % external solution Instill 2 drops in each ear twice daily x 7 days and as needed 60 mL 0     No current facility-administered medications for this visit.      Vitals and Measurements:       12/15/2022     7:06 AM 6/22/2023     8:43 AM 12/12/2023     7:04 AM 10/17/2024    12:12 PM 12/19/2024     7:21 AM 2/20/2025     7:28 AM 3/31/2025    11:00 AM   Vitals   Systolic 124 124 134 135 124  113   Diastolic 69 69 78 86 83  76   BP Location       Left arm   Heart Rate 79 70 76  76  80   Temp       36.5 °C (97.7 °F)   Resp       17   Height 1.626 m (5' 4\") 1.626 m (5' " "4\") 1.626 m (5' 4\") 1.626 m (5' 4\") 1.626 m (5' 4\") 1.626 m (5' 4\") 1.61 m (5' 3.39\")    Weight (lb) 188 188 184 188 190 180 187.94   BMI 32.27 kg/m2 32.27 kg/m2 31.58 kg/m2 32.27 kg/m2 32.61 kg/m2 30.9 kg/m2 32.89 kg/m2   BSA (m2) 1.96 m2 1.96 m2 1.94 m2 1.96 m2 1.97 m2 1.92 m2 1.95 m2   Visit Report   Report Report Report  Report       Significant value     Physical Exam:   Constitutional: alert, awake and oriented, not in acute distress   HEENT: moist mucous membranes, normal nose   Neck: supple, no lymphadenopathy   EYES: PERRL, EOM intact, conjunctiva normal  Skin: no jaundice, rash or erythema  Neurological: AAOx3, no gross focal deficit   Psychiatric: normal mood and behavior     Lab Results:  Lab Results   Component Value Date    WBC 3.0 (L) 01/22/2025    RBC 4.19 01/22/2025    MCV 90 01/22/2025    MCHC 31.4 (L) 01/22/2025    HGB 11.8 (L) 01/22/2025    HCT 37.6 01/22/2025     01/22/2025     No results found for: \"RETICCTPCT\"   Lab Results   Component Value Date    CREATININE 0.69 01/22/2025    BUN 13 01/22/2025    EGFR >90 01/22/2025     01/22/2025    K 4.3 01/22/2025     01/22/2025    CO2 27 01/22/2025      Lab Results   Component Value Date    ALT 17 03/19/2024    AST 22 03/19/2024    ALKPHOS 41 03/19/2024    BILITOT 0.5 03/19/2024      Lab Results   Component Value Date    TSH 1.74 11/16/2017     Lab Results   Component Value Date    TSH 1.74 11/16/2017       Assessment:    69 y.o. female w/no significant PMH referred for leucopenia.     Plan:    Reviewed and discussed lab, imaging, and pathology results with patient in detail as well as diagnosis, prognosis, and treatment options.    Will send work up; today's labs with normal WBC and diff.    Anemia likely due to frequently donating blood.    Discussed role of BMBx    F/U w/PCP    RTC in 2 weeks    Patient verbalized understanding, and all her questions were answered to her satisfaction    60 min spent with patient greater than 50 % " of which was spent in consultation and coordination of care.

## 2025-04-01 LAB
ANA PATTERN: ABNORMAL
ANA SER QL HEP2 SUBST: POSITIVE
ANA TITR SER IF: ABNORMAL {TITER}
CENTROMERE B AB SER-ACNC: <0.2 AI
CHROMATIN AB SERPL-ACNC: <0.2 AI
DSDNA AB SER-ACNC: <1 IU/ML
ENA JO1 AB SER QL IA: <0.2 AI
ENA RNP AB SER IA-ACNC: <0.2 AI
ENA SCL70 AB SER QL IA: <0.2 AI
ENA SM AB SER IA-ACNC: <0.2 AI
ENA SM+RNP AB SER QL IA: <0.2 AI
ENA SS-A AB SER IA-ACNC: <0.2 AI
ENA SS-B AB SER IA-ACNC: <0.2 AI
KAPPA LC SERPL-MCNC: 1.61 MG/DL (ref 0.33–1.94)
KAPPA LC/LAMBDA SER: 1.59 {RATIO} (ref 0.26–1.65)
LAMBDA LC SERPL-MCNC: 1.01 MG/DL (ref 0.57–2.63)
RIBOSOMAL P AB SER-ACNC: <0.2 AI

## 2025-04-02 LAB
ALBUMIN: 4.6 G/DL (ref 3.4–5)
ALPHA 1 GLOBULIN: 0.3 G/DL (ref 0.2–0.6)
ALPHA 2 GLOBULIN: 0.7 G/DL (ref 0.4–1.1)
BETA GLOBULIN: 0.8 G/DL (ref 0.5–1.2)
EPO SERPL-ACNC: 17 MU/ML (ref 4–27)
GAMMA GLOBULIN: 1 G/DL (ref 0.5–1.4)
IMMUNOFIXATION COMMENT: NORMAL
PATH REVIEW - SERUM IMMUNOFIXATION: NORMAL
PATH REVIEW-SERUM PROTEIN ELECTROPHORESIS: NORMAL
PROTEIN ELECTROPHORESIS COMMENT: NORMAL

## 2025-04-03 LAB
CELL COUNT (BLOOD): 4.7 X10*3/UL
CELL POPULATIONS: NORMAL
CYTOGENETICS/MOLECULAR TEST ORDERED: NO
DIAGNOSIS: NORMAL
FLOW DIFFERENTIAL: NORMAL
FLOW TEST ORDERED: NORMAL
LAB TEST METHOD: NORMAL
NUMBER OF CELLS COLLECTED: NORMAL PER TUBE
PATH REPORT.TOTAL CANCER: NORMAL
RBC MORPH BLD: NORMAL
SIGNATURE COMMENT: NORMAL
SPECIMEN VIABILITY: NORMAL
WBC MORPH BLD: NORMAL

## 2025-04-08 LAB
CHROM ANALY OVERALL INTERP-IMP: NORMAL
ELECTRONICALLY SIGNED BY CYTOGENETICS: NORMAL

## 2025-04-15 ENCOUNTER — TELEPHONE (OUTPATIENT)
Dept: PRIMARY CARE | Facility: CLINIC | Age: 70
End: 2025-04-15
Payer: COMMERCIAL

## 2025-04-15 NOTE — TELEPHONE ENCOUNTER
Last office visit: 12/19/2024  Next office visit: Visit date not found    Requesting medication refill for vertigo but I see nothing in her med history

## 2025-04-22 ENCOUNTER — TELEMEDICINE (OUTPATIENT)
Dept: HEMATOLOGY/ONCOLOGY | Facility: CLINIC | Age: 70
End: 2025-04-22
Payer: COMMERCIAL

## 2025-04-22 DIAGNOSIS — D50.9 IRON DEFICIENCY ANEMIA, UNSPECIFIED IRON DEFICIENCY ANEMIA TYPE: ICD-10-CM

## 2025-04-22 DIAGNOSIS — D46.4 REFRACTORY ANEMIA: ICD-10-CM

## 2025-04-22 DIAGNOSIS — D70.9 NEUTROPENIA, UNSPECIFIED TYPE: ICD-10-CM

## 2025-04-22 PROCEDURE — 1123F ACP DISCUSS/DSCN MKR DOCD: CPT | Performed by: PHYSICIAN ASSISTANT

## 2025-04-22 PROCEDURE — 99213 OFFICE O/P EST LOW 20 MIN: CPT | Performed by: PHYSICIAN ASSISTANT

## 2025-04-22 NOTE — PROGRESS NOTES
Visit Type: Benign Heme Follow-up, Virtual Visit:  A Virtual visit (telephone only) between the patient (at the originating site) and the provider (at the distant site) was utilized to provide this telehealth service.   Verbal Consent for Encounter: Verbal consent was requested and obtained from patient, or from parent/guardian if minor, on this date for a telehealth visit.     Reason for Visit  Jacquelin Mehta is a 69 y.o. female w/no significant PMH referred by Anamaria Mueller CNP for leucopenia.     Upon review of labs, noted to have leucopenia with WBC in the 3 range since 2017, no CBC with differential  noted. Intermittent anemia and normal platelet count.    On assessment, reports feeling well. Donates blood every 9 weeks, recently donated blood. Exercises almost every day. Denies frequent infection, B symptoms, LAD, n/v/d/c/d/abd pain, SOB/ZAMAN/CP, neuropathy, rash, bleeding from any source or any other complaints at this time.    PMH/PSH: S/p 12/1/2017 Surgery: Robotic KRISTINA, bilateral salpingectomy, bilateral oophorectomy, SCPXY with mesh, perineorrhaphy, cystoscopy, Restorelle Y mesh and Advantage FIT MUS by UTILICASE, Small bowel obstruction secondary to an incarcerated right inguinal hernia in 5/16/20217  FH: Denies FH of cancer, bleeding, clotting disorder.  Soc Hx: Denies smoking, ETOH, illicit drugs; works for department of [employer]; , 2 children.    History of Present Illness:  No new complaints.    Review of Systems: All of the systems have been reviewed and are negative for complaints except what is stated in the HPI and/or past medical history.    Allergies and Intolerances:  No Known Allergies         Outpatient Medication Profile:  Current Outpatient Medications   Medication Sig Dispense Refill    clobetasol (Temovate) 0.05 % external solution Instill 2 drops in each ear twice daily x 7 days and as needed (Patient not taking: Reported on 3/31/2025) 60 mL 0     No current  "facility-administered medications for this visit.      Vitals and Measurements:       12/15/2022     7:06 AM 6/22/2023     8:43 AM 12/12/2023     7:04 AM 10/17/2024    12:12 PM 12/19/2024     7:21 AM 2/20/2025     7:28 AM 3/31/2025    11:00 AM   Vitals   Systolic 124 124 134 135 124  113   Diastolic 69 69 78 86 83  76   BP Location       Left arm   Heart Rate 79 70 76  76  80   Temp       36.5 °C (97.7 °F)   Resp       17   Height 1.626 m (5' 4\") 1.626 m (5' 4\") 1.626 m (5' 4\") 1.626 m (5' 4\") 1.626 m (5' 4\") 1.626 m (5' 4\") 1.61 m (5' 3.39\")    Weight (lb) 188 188 184 188 190 180 187.94   BMI 32.27 kg/m2 32.27 kg/m2 31.58 kg/m2 32.27 kg/m2 32.61 kg/m2 30.9 kg/m2 32.89 kg/m2   BSA (m2) 1.96 m2 1.96 m2 1.94 m2 1.96 m2 1.97 m2 1.92 m2 1.95 m2   Visit Report   Report Report Report  Report       Significant value     Physical Exam:   Deferred due to telehealth    Lab Results:  Office Visit on 03/31/2025   Component Date Value Ref Range Status    Glucose 03/31/2025 89  74 - 99 mg/dL Final    Sodium 03/31/2025 138  136 - 145 mmol/L Final    Potassium 03/31/2025 4.4  3.5 - 5.3 mmol/L Final    Chloride 03/31/2025 103  98 - 107 mmol/L Final    Bicarbonate 03/31/2025 27  21 - 32 mmol/L Final    Anion Gap 03/31/2025 12  10 - 20 mmol/L Final    Urea Nitrogen 03/31/2025 16  6 - 23 mg/dL Final    Creatinine 03/31/2025 0.67  0.50 - 1.05 mg/dL Final    eGFR 03/31/2025 >90  >60 mL/min/1.73m*2 Final    Calculations of estimated GFR are performed using the 2021 CKD-EPI Study Refit equation without the race variable for the IDMS-Traceable creatinine methods.  https://jasn.asnjournals.org/content/early/2021/09/22/ASN.7273748030    Calcium 03/31/2025 9.1  8.6 - 10.3 mg/dL Final    Albumin 03/31/2025 4.5  3.4 - 5.0 g/dL Final    Alkaline Phosphatase 03/31/2025 43  33 - 136 U/L Final    Total Protein 03/31/2025 6.8  6.4 - 8.2 g/dL Final    AST 03/31/2025 27  9 - 39 U/L Final    Bilirubin, Total 03/31/2025 0.5  0.0 - 1.2 mg/dL Final    ALT " 03/31/2025 26  7 - 45 U/L Final    Patients treated with Sulfasalazine may generate falsely decreased results for ALT.    Ferritin 03/31/2025 14  8 - 150 ng/mL Final    Erythropoietin 03/31/2025 17  4 - 27 mU/mL Final    Comment: INTERPRETIVE INFORMATION: Erythropoietin  Normal serum concentrations of erythropoietin for 95% of   individuals with normal hematocrits range from 4-27 mU/mL.    As the hematocrit is lowered by iron deficiency, aplastic, or   hemolytic anemia, the concentration of erythropoietin increases as   shown in the graph below. In the absence of anemia, elevated   concentrations are seen in renal tumors, as a manifestation of   renal transplant rejection, and in secondary polycythemia. Low   values may be observed in hemochromatosis.          Expected Erythropoietin Concentrations in Patients                     with Uncomplicated Anemia       Erythropoietin (mU/mL)                100,000 - +                          +                 10,000 - +.......                          + .......                  1,000 - +    .......                          +     ........                    100 - +       ........                          +        ........                     10 - +          ........                                                     +---+---+---+---+---+---+                         10   20  30  40  50  60  70                                (Hematocrit %)              (Contributions To Nephrology 1988:66:54-62)    Decreased erythropoietin concentrations with an elevated   hematocrit are observed in patients with polycythemia rubra vera,   and with a decreased hematocrit in patients with HIV infection who   are receiving AZT.  Patients on AZT who have anemia and   erythropoietin concentrations of less than or equal to 500 mU/mL   may benefit from therapy with recombinant EPO ([facility]   [phone],1990).  Performed By: RF Code  [address] [place] [address]  Director: Chava Katz MD, PhD  IA Number: [id number]    MADAI-POLYSPECIFIC 03/31/2025 NEG   Final    LDH 03/31/2025 157  84 - 246 U/L Final    Iron 03/31/2025 53  35 - 150 ug/dL Final    UIBC 03/31/2025 386 (H)  110 - 370 ug/dL Final    TIBC 03/31/2025 439  240 - 445 ug/dL Final    % Saturation 03/31/2025 12 (L)  25 - 45 % Final    Hepatitis C AB 03/31/2025 Nonreactive  Nonreactive Final    Results from patients taking biotin supplements or receiving high-dose biotin therapy should be interpreted with caution due to possible interference with this test. Providers may contact their local laboratory for further information.    Hepatitis B Surface AG 03/31/2025 Nonreactive  Nonreactive Final    Biotin interference may cause falsely decreased results. Patients taking a Biotin dose of up to 5 mg/day should refrain from taking Biotin for 24 hours before sample collection. Providers may contact their local laboratory for  further information.    Hepatitis B Surface AB 03/31/2025 40.3 (H)  <10.0 mIU/mL Final    Interpretive Criteria:                         <10 mIU/mL    Nonreactive                          >=10 mIU/mL    Reactive     Biotin interference may cause falsely decreased results. Patients taking a Biotin dose of up to 5 mg/day should refrain from taking Biotin for 24 hours before sample collection. Providers may contact their local laboratory for further information.    Hepatitis B Core AB- Total 03/31/2025 Nonreactive  Nonreactive Final    Haptoglobin 03/31/2025 123  30 - 200 mg/dL Final    Folate, Serum 03/31/2025 15.3  >5.0 ng/mL Final    Vitamin B12 03/31/2025 387  211 - 911 pg/mL Final    Retic % 03/31/2025 1.5  0.5 - 2.0 % Final    Retic Absolute 03/31/2025 0.062  0.017 - 0.110 x10*6/uL Final    Reticulocyte Hemoglobin 03/31/2025 28  28 - 38 pg Final    Immature Retic fraction 03/31/2025 11.2  <=16.0 % Final    Reticulocytes are measured based on a fluorescent technique. The IRF, or immature  "reticulocyte fraction, is the percent of reticulocytes that show medium (MFR) or high (HFR) fluorescence.  This value can be used to assess the relative maturity of the reticulocyte population in response to anemia. The \"shift reticulocytes\" are not measured by this technique, eliminating the need for their correction in the reticulocyte index.    IgG 03/31/2025 1,160  700 - 1,600 mg/dL Final    IgA 03/31/2025 160  70 - 400 mg/dL Final    IgM 03/31/2025 21 (L)  40 - 230 mg/dL Final    Ig Kappa Free Light Chain 03/31/2025 1.61  0.33 - 1.94 mg/dL Final    Ig Lambda Free Light Chain 03/31/2025 1.01  0.57 - 2.63 mg/dL Final    Kappa/Lambda Ratio 03/31/2025 1.59  0.26 - 1.65 Final    KAREN 03/31/2025 Positive (A)  Negative Final    The Antinuclear Antibody (KAREN) test was performed using  indirect immunofluorescence assay with HEp-2 cells slide.    KAREN Pattern 03/31/2025 Homogeneous   Final    KAREN Titer 03/31/2025 1:80   Final    Total Protein 03/31/2025 7.4  6.4 - 8.2 g/dL Final    Albumin 03/31/2025 4.6  3.4 - 5.0 g/dL Final    Alpha 1 Globulin 03/31/2025 0.3  0.2 - 0.6 g/dL Final    Alpha 2 Globulin 03/31/2025 0.7  0.4 - 1.1 g/dL Final    Beta Globulin 03/31/2025 0.8  0.5 - 1.2 g/dL Final    Gamma 03/31/2025 1.0  0.5 - 1.4 g/dL Final    Protein Electrophoresis Comment 03/31/2025 Normal.   Final    Immunofixation Comment 03/31/2025 No monoclonal protein detected by immunofixation.   Final    Path Review - Serum Protein Electr* 03/31/2025 Reviewed and approved by VICTORINA TRACY on 4/2/25 at 6:22 PM.       Final    Path Review - Serum Immunofixation 03/31/2025 Reviewed and approved by VICTORINA TRACY on 4/2/25 at 6:22 PM.       Final    Case Report 03/31/2025    Final                    Value:Flow Cytometry                                    Case: [id number]                                   Authorizing Provider:  Alma Delia Crabtree PA-C          Collected:           03/31/2025 1115              Ordering Location:     [facility] " [facility]  Received:            03/31/2025 1115                                     Center                                                                       Pathologist:           Rony Quan MD                                                        Specimen:    Blood, Venous                                                                              Diagnosis 03/31/2025    Final                    Value:--No specific immunophenotypic or morphologic abnormality was identified, see note.     Note: If cell count abnormalities persist and remain unexplained, a bone marrow aspirate with additional associated genetic studies may be helpful in the evaluation of the process. Clinical correlation recommended.             03/31/2025    Final                    Value:By the signature on this report, the individual or group listed as making the Final Interpretation/Diagnosis certifies that they have reviewed this case and the staining reactivity of the antibodies and reagents in the analysis were determined to be acceptable. Diagnostic interpretation performed at [facility]      Flow Differential 03/31/2025    Final                    Value:Lymphocyte: 28 %       CD3+CD4+: 43 % ;  Polyclonal          CD3+CD8+: 18 % ;  Polyclonal          Natural Killer Cells: 18 %         CD19+: 19 %         B Cell Light Chain Expression: Polyclonal           Surface Kappa/Surface Lambda:  12:9         Monocyte: 4 %     No immunophenotypic abnormality was detected.      Granulocyte: 60 %     No immunophenotypic abnormality was detected.           Flow Test Ordered 03/31/2025 Acute Panel  not established Final    Cell Count 03/31/2025 4.70  not established x10*3/uL Final    Number of Cells Collected 03/31/2025 100,000.00  not established per tube Final    Cytogenetics/Molecular Test Ordered 03/31/2025 No  not established Final    Red Cells 03/31/2025 Normal  not established Final    White Cells 03/31/2025 Normal  not  established Final    Methodology 03/31/2025    Final                    Value:Reference ranges not established.    This test is a multicolor, whole blood lysis assay. It was developed and its performance characteristics determined by the Department of Pathology, [facility], and has not been cleared or approved by the U.S. Food and Drug Administration. The laboratory is regulated under CLIA as qualified to perform high complexity testing. This test is used for clinical purposes. It should not be regarded as investigational or for research.    Immunophenotypic analysis was performed using the following antibodies: 1A: CD45, Non-FFPE Cytogenetics Culture and Hold. 1B: CD71, CD1c, , CD34, CD14, CD45. 1C: CD20, CD19, CD10, CD34, CD38, CD45. 1D: CD15, , CD33, CD34, HLA-DR, CD45. 1E: CD8, CD7, CD4, CD34, CD3, CD45. 1F: CD56, CD13, CD5, CD34, CD2, CD45. 1G: , CD13, , CD11b, CD16, CD45. 1H: Kappa Surface, Lambda Surface, CD9, CD22, CD19, CD45. 1I: TRBC1, TCR Gamma/Delta, CD4, CD8, CD3, CD45.          WBC 03/31/2025 4.7  4.4 - 11.3 x10*3/uL Final    nRBC 03/31/2025    Final    Not Measured    RBC 03/31/2025 4.09  4.00 - 5.20 x10*6/uL Final    Hemoglobin 03/31/2025 11.5 (L)  12.0 - 16.0 g/dL Final    Hematocrit 03/31/2025 35.7 (L)  36.0 - 46.0 % Final    MCV 03/31/2025 87  80 - 100 fL Final    MCH 03/31/2025 28.1  26.0 - 34.0 pg Final    MCHC 03/31/2025 32.2  32.0 - 36.0 g/dL Final    RDW 03/31/2025 15.2 (H)  11.5 - 14.5 % Final    Platelets 03/31/2025 225  150 - 450 x10*3/uL Final    Neutrophils % 03/31/2025 64.7  40.0 - 80.0 % Final    Immature Granulocytes %, Automated 03/31/2025 0.0  0.0 - 0.9 % Final    Immature Granulocyte Count (IG) includes promyelocytes, myelocytes and metamyelocytes but does not include bands. Percent differential counts (%) should be interpreted in the context of the absolute cell counts (cells/UL).    Lymphocytes % 03/31/2025 29.4  13.0 - 44.0 % Final     Monocytes % 03/31/2025 4.9  2.0 - 10.0 % Final    Eosinophils % 03/31/2025 0.4  0.0 - 6.0 % Final    Basophils % 03/31/2025 0.6  0.0 - 2.0 % Final    Neutrophils Absolute 03/31/2025 3.06  1.20 - 7.70 x10*3/uL Final    Percent differential counts (%) should be interpreted in the context of the absolute cell counts (cells/uL).    Immature Granulocytes Absolute, Au* 03/31/2025 0.00  0.00 - 0.70 x10*3/uL Final    Lymphocytes Absolute 03/31/2025 1.39  1.20 - 4.80 x10*3/uL Final    Monocytes Absolute 03/31/2025 0.23  0.10 - 1.00 x10*3/uL Final    Eosinophils Absolute 03/31/2025 0.02  0.00 - 0.70 x10*3/uL Final    Basophils Absolute 03/31/2025 0.03  0.00 - 0.10 x10*3/uL Final    Anti-SM 03/31/2025 <0.2  <1.0 AI Final    < 1.0 = NEGATIVE  >=1.0 = POSITIVE    Anti-RNP 03/31/2025 <0.2  <1.0 AI Final    < 1.0 = NEGATIVE  >=1.0 = POSITIVE    Anti-SM/RNP 03/31/2025 <0.2  <1.0 AI Final    < 1.0 = NEGATIVE  >=1.0 = POSITIVE    Anti-SSA 03/31/2025 <0.2  <1.0 AI Final    < 1.0 = NEGATIVE  >=1.0 = POSITIVE    Anti-SSB 03/31/2025 <0.2  <1.0 AI Final    < 1.0 = NEGATIVE  >=1.0 = POSITIVE    Anti-SCL-70 03/31/2025 <0.2  <1.0 AI Final    < 1.0 = NEGATIVE  >=1.0 = POSITIVE    Anti-ADAMS-1 IgG 03/31/2025 <0.2  <1.0 AI Final    < 1.0 = NEGATIVE  >=1.0 = POSITIVE    Anti-Chromatin 03/31/2025 <0.2  <1.0 AI Final    < 1.0 = NEGATIVE  >=1.0 = POSITIVE    Anti-Centromere 03/31/2025 <0.2  <1.0 AI Final    < 1.0 = NEGATIVE  >=1.0 = POSITIVE    ANTI-RIBOSOMAL P 03/31/2025 <0.2  <1.0 AI Final    < 1.0 = NEGATIVE  >=1.0 = POSITIVE    Anti-DNA (DS) 03/31/2025 <1.0  <5.0 IU/mL Final    NEGATIVE: <= 4 IU/ML  EQUIVOCAL: 5- 9 IU/ML  POSITIVE: >=10 IU/ML    Cytogenetics Interpretation 03/31/2025    Final                    Value:Per Hematopathology Protocol, this 3/31/2025 Blood specimen has been placed on culture and hold status.      Cultured cells will be kept until 10/5/2025.  Chromosome and FISH testing can be added to the specimen up to 180 days after  receipt.  Please contact the Genetics lab at [phone] for details.      Electronically signed and reported* 03/31/2025    Final                    Value:Deborah Fuentes Ph.D. FACMG, [facility]      Lab on 01/22/2025   Component Date Value Ref Range Status    WBC 01/22/2025 3.0 (L)  4.4 - 11.3 x10*3/uL Final    nRBC 01/22/2025 0.0  0.0 - 0.0 /100 WBCs Final    RBC 01/22/2025 4.19  4.00 - 5.20 x10*6/uL Final    Hemoglobin 01/22/2025 11.8 (L)  12.0 - 16.0 g/dL Final    Hematocrit 01/22/2025 37.6  36.0 - 46.0 % Final    MCV 01/22/2025 90  80 - 100 fL Final    MCH 01/22/2025 28.2  26.0 - 34.0 pg Final    MCHC 01/22/2025 31.4 (L)  32.0 - 36.0 g/dL Final    RDW 01/22/2025 13.7  11.5 - 14.5 % Final    Platelets 01/22/2025 258  150 - 450 x10*3/uL Final    Glucose 01/22/2025 86  74 - 99 mg/dL Final    Sodium 01/22/2025 141  136 - 145 mmol/L Final    Potassium 01/22/2025 4.3  3.5 - 5.3 mmol/L Final    Chloride 01/22/2025 105  98 - 107 mmol/L Final    Bicarbonate 01/22/2025 27  21 - 32 mmol/L Final    Anion Gap 01/22/2025 13  10 - 20 mmol/L Final    Urea Nitrogen 01/22/2025 13  6 - 23 mg/dL Final    Creatinine 01/22/2025 0.69  0.50 - 1.05 mg/dL Final    eGFR 01/22/2025 >90  >60 mL/min/1.73m*2 Final    Calculations of estimated GFR are performed using the 2021 CKD-EPI Study Refit equation without the race variable for the IDMS-Traceable creatinine methods.  https://jasn.asnjournals.org/content/early/2021/09/22/ASN.5217604889    Calcium 01/22/2025 9.0  8.6 - 10.3 mg/dL Final    Cholesterol 01/22/2025 229 (H)  0 - 199 mg/dL Final          Age      Desirable   Borderline High   High     0-19 Y     0 - 169       170 - 199     >/= 200    20-24 Y     0 - 189       190 - 224     >/= 225         >24 Y     0 - 199       200 - 239     >/= 240   **All ranges are based on fasting samples. Specific   therapeutic targets will vary based on patient-specific   cardiac risk.    Pediatric guidelines reference:Pediatrics 2011, 128(S5).Adult  guidelines reference: NCEP ATPIII Guidelines,DORIS 2001, 258:2486-97    Venipuncture immediately after or during the administration of Metamizole may lead to falsely low results. Testing should be performed immediately prior to Metamizole dosing.    HDL-Cholesterol 01/22/2025 57.7  mg/dL Final      Age       Very Low   Low     Normal    High    0-19 Y    < 35      < 40     40-45     ----  20-24 Y    ----     < 40      >45      ----        >24 Y      ----     < 40     40-60      >60      Cholesterol/HDL Ratio 01/22/2025 4.0   Final      Ref Values  Desirable  < 3.4  High Risk  > 5.0    LDL Calculated 01/22/2025 154 (H)  <=99 mg/dL Final                                Near   Borderline      AGE      Desirable  Optimal    High     High     Very High     0-19 Y     0 - 109     ---    110-129   >/= 130     ----    20-24 Y     0 - 119     ---    120-159   >/= 160     ----      >24 Y     0 -  99   100-129  130-159   160-189     >/=190      VLDL 01/22/2025 18  0 - 40 mg/dL Final    Triglycerides 01/22/2025 89  0 - 149 mg/dL Final    Age              Desirable        Borderline         High        Very High  SEX:B           mg/dL             mg/dL               mg/dL      mg/dL  <=14D                       ----               ----        ----  15D-365D                    ----               ----        ----  1Y-9Y           0-74               75-99             >=100       ----  10Y-19Y        0-89                            >=130       ----  20Y-24Y        0-114             115-149             >=150      ----  >= 25Y         0-149             150-199             200-499    >=500      Venipuncture immediately after or during the administration of Metamizole may lead to falsely low results. Testing should be performed immediately prior to Metamizole dosing.    Non HDL Cholesterol 01/22/2025 171 (H)  0 - 149 mg/dL Final          Age       Desirable   Borderline High   High     Very High     0-19 Y     0 - 119        120 - 144     >/= 145    >/= 160    20-24 Y     0 - 149       150 - 189     >/= 190      ----         >24 Y    30 mg/dL above LDL Cholesterol goal      Hepatitis C AB 01/22/2025 Nonreactive  Nonreactive Final    Results from patients taking biotin supplements or receiving high-dose biotin therapy should be interpreted with caution due to possible interference with this test. Providers may contact their local laboratory for further information.    Hemoglobin A1C 01/22/2025 5.2  See comment % Final    Estimated Average Glucose 01/22/2025 103  Not Established mg/dL Final       Assessment:    69 y.o. female w/no significant PMH referred for leucopenia.     Plan:    Reviewed and discussed lab, imaging, and pathology results with patient in detail as well as diagnosis, prognosis, and treatment options.    Work up unremarkable.     Anemia likely due to frequently donating blood every 9 weeks. Recommend oral iron to prevent anemia.    Discussed role of BMBx    F/U w/PCP    RTC PRN    Patient verbalized understanding, and all her questions were answered to her satisfaction    30 min spent with patient greater than 50 % of which was spent in consultation and coordination of care via telehealth.

## 2025-06-10 ENCOUNTER — APPOINTMENT (OUTPATIENT)
Dept: HEMATOLOGY/ONCOLOGY | Facility: HOSPITAL | Age: 70
End: 2025-06-10
Payer: COMMERCIAL

## 2025-12-04 ENCOUNTER — APPOINTMENT (OUTPATIENT)
Dept: PRIMARY CARE | Facility: CLINIC | Age: 70
End: 2025-12-04
Payer: COMMERCIAL